# Patient Record
Sex: FEMALE
[De-identification: names, ages, dates, MRNs, and addresses within clinical notes are randomized per-mention and may not be internally consistent; named-entity substitution may affect disease eponyms.]

---

## 2021-06-01 DIAGNOSIS — R06.02 SHORTNESS OF BREATH: ICD-10-CM

## 2021-06-01 PROBLEM — Z00.00 ENCOUNTER FOR PREVENTIVE HEALTH EXAMINATION: Status: ACTIVE | Noted: 2021-06-01

## 2021-06-07 ENCOUNTER — APPOINTMENT (OUTPATIENT)
Dept: PULMONOLOGY | Facility: CLINIC | Age: 61
End: 2021-06-07
Payer: MEDICAID

## 2021-06-07 VITALS
TEMPERATURE: 97.1 F | OXYGEN SATURATION: 96 % | HEART RATE: 96 BPM | DIASTOLIC BLOOD PRESSURE: 79 MMHG | SYSTOLIC BLOOD PRESSURE: 127 MMHG

## 2021-06-07 PROCEDURE — 99204 OFFICE O/P NEW MOD 45 MIN: CPT | Mod: 25

## 2021-06-07 PROCEDURE — 94729 DIFFUSING CAPACITY: CPT

## 2021-06-07 PROCEDURE — 94727 GAS DIL/WSHOT DETER LNG VOL: CPT

## 2021-06-07 PROCEDURE — 94010 BREATHING CAPACITY TEST: CPT

## 2021-06-07 RX ORDER — PREGABALIN 75 MG/1
75 CAPSULE ORAL TWICE DAILY
Refills: 0 | Status: ACTIVE | COMMUNITY
Start: 2021-06-07

## 2021-06-07 RX ORDER — DULOXETINE HYDROCHLORIDE 60 MG/1
60 CAPSULE, DELAYED RELEASE PELLETS ORAL TWICE DAILY
Refills: 0 | Status: ACTIVE | COMMUNITY
Start: 2021-06-07

## 2021-06-07 NOTE — HISTORY OF PRESENT ILLNESS
[Never] : never [TextBox_4] : the patient is 60 year F with a hx of asthma and had limited her lifestyle  Se has since found out that she had a diary allergy She stopped the diary and rarely has an asthma attack She had other triggers but they are not as active. She had previously used daily inhalers   She does not have any other . She had knee surgeries X2 and had no operative complications  She denies known sleep apnea  She does not have any daytime somnolence and had no resp complications during her recent knee surgeries

## 2021-06-07 NOTE — PHYSICAL EXAM
[No Acute Distress] : no acute distress [Normal Rate/Rhythm] : normal rate/rhythm [Normal S1, S2] : normal s1, s2 [No Resp Distress] : no resp distress [Clear to Auscultation Bilaterally] : clear to auscultation bilaterally [Benign] : benign [Normal Gait] : normal gait [No Clubbing] : no clubbing [No Cyanosis] : no cyanosis [FROM] : FROM [Normal Color/ Pigmentation] : normal color/ pigmentation [No Focal Deficits] : no focal deficits [Oriented x3] : oriented x3 [TextBox_2] : morbidly obese  [TextBox_105] : slight edema left ankle

## 2021-06-07 NOTE — REASON FOR VISIT
[Initial] : an initial visit [Asthma] : asthma [TextBox_13] : patient presents for medical clearance for Bariatric surgery

## 2021-06-07 NOTE — REVIEW OF SYSTEMS
[Postnasal Drip] : postnasal drip [SOB on Exertion] : sob on exertion [Dizziness] : dizziness [Diabetes] : no diabetes [Thyroid Problem] : no thyroid problem [Obesity] : obesity [Negative] : Psychiatric [TextBox_3] : highest weight  [TextBox_14] : deviated septum  [TextBox_30] : nothing more than from her weight  [TextBox_57] : not active at this time  [TextBox_94] : s/p knee surgeries  [TextBox_122] : only occasionally

## 2021-06-07 NOTE — ASSESSMENT
[FreeTextEntry1] : The patient has a hx of asthma and is going for a partial gastrectomy, "the sleeve" operation. She had two recent knee replacements without any complications. Her PFT in the office was normal There is a potential for more resp sx with an abdominal surgery, however the patient's asthma has been quiescent and she has no sx of LALA  The patient has no more risk for respiratory complications than from her obesity alone   The patient has no respiratory contraindication for the proposed partial gastrectomy and general anesthesia

## 2021-07-06 DIAGNOSIS — J45.909 UNSPECIFIED ASTHMA, UNCOMPLICATED: ICD-10-CM

## 2021-07-06 RX ORDER — ALBUTEROL SULFATE 90 UG/1
108 (90 BASE) INHALANT RESPIRATORY (INHALATION)
Qty: 1 | Refills: 2 | Status: ACTIVE | COMMUNITY
Start: 2021-06-07 | End: 1900-01-01

## 2024-01-05 ENCOUNTER — NON-APPOINTMENT (OUTPATIENT)
Age: 64
End: 2024-01-05

## 2024-01-05 ENCOUNTER — APPOINTMENT (OUTPATIENT)
Dept: RHEUMATOLOGY | Facility: CLINIC | Age: 64
End: 2024-01-05
Payer: MEDICAID

## 2024-01-05 VITALS
DIASTOLIC BLOOD PRESSURE: 73 MMHG | BODY MASS INDEX: 38.09 KG/M2 | TEMPERATURE: 98.1 F | HEART RATE: 91 BPM | SYSTOLIC BLOOD PRESSURE: 117 MMHG | WEIGHT: 215 LBS | OXYGEN SATURATION: 95 % | HEIGHT: 63 IN

## 2024-01-05 DIAGNOSIS — R76.8 OTHER SPECIFIED ABNORMAL IMMUNOLOGICAL FINDINGS IN SERUM: ICD-10-CM

## 2024-01-05 DIAGNOSIS — G62.9 POLYNEUROPATHY, UNSPECIFIED: ICD-10-CM

## 2024-01-05 PROCEDURE — 99204 OFFICE O/P NEW MOD 45 MIN: CPT

## 2024-01-05 NOTE — PHYSICAL EXAM
[TextEntry] : GENERAL: Appears in no acute distress  HEENT: EOMI, PERRLA. No conjunctival erythema. Moist mucous membranes. No nasopharyngeal ulcers  NECK: Supple, no cervical lymphadenopathy, no thyromegaly  CARDIOVASCULAR: RRR. S1, S2 auscultated. No murmurs or rubs.  PULMONARY: Clear to auscultation b/l, no wheezes, rales, or crackles  ABDOMINAL: Soft, nontender, nondistended. Bowel sounds present. No organomegaly.  MSK:  No active synovitis, swelling, erythema, or warmth.  No joint tenderness to palpation.  No deformities.  Normal ROM of neck, back, b/l upper and lower extremities.  SKIN: No lesions or rashes  NEURO: Reported RLE thigh and knees numbness to palpation PSYCH: AAOx3. Normal affect and thought process.

## 2024-01-05 NOTE — HISTORY OF PRESENT ILLNESS
[FreeTextEntry1] : 64 y/o female w/ asthma, GERD referred to rheumatology by neuro for abnormal labs.  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 Pt has Hx of b/l knee replacements. Denies any joint pains, swelling, redness, warmth. Pt is s/p L hand CTS surgery, may need R hand CTS surgery. Pt is on duloxetine 60mg/day and Lyrica 75mg BID for neuropathy.  Pt has had sores of abdomen, sides, back with pruritus. Pt was seen by dermatology without specific diagnosis. Reports frequent epistaxis.  WORKUP: Remarkable for (12/2023): NACHO 1:40 nuclear, speckled, cytoplasmic Normal/neg (12/2023): CBC, CMP, ESR, SSA, SSB, RNP, Smith, RF, ANCAs, ACE level, CPK, aldolase, SPEP, YADIRA, cryoglobulins, HgbA1C, TSH, B12, Lyme MR L-spine (11/2023): Stable multilevel degenerative change without high-grade spinal canal stenosis or nerve root compressive.

## 2024-01-05 NOTE — ASSESSMENT
[FreeTextEntry1] : 62 y/o female w/ asthma, GERD referred to rheumatology by neuro for abnormal labs.  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 Pt has Hx of b/l knee replacements. Denies any joint pains, swelling, redness, warmth. Pt is s/p L hand CTS surgery, may need R hand CTS surgery. Pt is on duloxetine 60mg/day and Lyrica 75mg BID for neuropathy.  Pt has had sores of abdomen, sides, back with pruritus. Pt was seen by dermatology without specific diagnosis. Reports frequent epistaxis.  Pt has NACHO 1:40 (lower than 1:80 which I would consider borderline) in setting of evaluation for causes of neuropathy. Patient does not have inflammatory joint pain, malar rash, photosensitivity, sicca symptoms, Raynauds. Based on existing labs, patient does not have hemolytic anemia, leukopenia, thrombocytopenia, kidney disease. Exam without synovitis, rashes, changes of Raynauds.  Pt already had NACHO subserologies and other autoimmune workup done by neuro which were negative. At this time, I do not have any concerns for underlying autoimmune disease causing neuropathy or other symptoms.  NACHO is a marker that is sensitive but not specific for underlying rheumatologic diseases such as lupus. The most common causes of positive NACHO in patients without underlying rheumatologic diseases are infections, malignancies, and other autoimmune diseases such as Hashimotos. Up to 30% of patients without any underlying disease can have positive NACHO.  RTC PRN